# Patient Record
Sex: FEMALE | HISPANIC OR LATINO | URBAN - METROPOLITAN AREA
[De-identification: names, ages, dates, MRNs, and addresses within clinical notes are randomized per-mention and may not be internally consistent; named-entity substitution may affect disease eponyms.]

---

## 2018-07-23 ENCOUNTER — OFFICE VISIT (OUTPATIENT)
Dept: URBAN - METROPOLITAN AREA CLINIC 48 | Facility: CLINIC | Age: 78
End: 2018-07-23
Payer: COMMERCIAL

## 2018-07-23 DIAGNOSIS — H40.1132 PRIMARY OPEN-ANGLE GLAUCOMA, BILATERAL, MODERATE STAGE: Primary | ICD-10-CM

## 2018-07-23 PROCEDURE — 92083 EXTENDED VISUAL FIELD XM: CPT | Performed by: OPHTHALMOLOGY

## 2018-07-23 PROCEDURE — 92014 COMPRE OPH EXAM EST PT 1/>: CPT | Performed by: OPHTHALMOLOGY

## 2018-07-23 PROCEDURE — 92133 CPTRZD OPH DX IMG PST SGM ON: CPT | Performed by: OPHTHALMOLOGY

## 2018-07-23 ASSESSMENT — INTRAOCULAR PRESSURE
OD: 15
OS: 16

## 2018-07-23 NOTE — IMPRESSION/PLAN
Impression: Primary open-angle glaucoma, bilateral, moderate stage: F57.5042. Plan: IOP elevated on current regimen, due to patient not being compliant with eye drop regimen. Discussed risks and benefits and patient understands. Advised patient of condition. Emphasized and explained compliance. Poor compliance can lead to blindness. Discussed risks of progression. Medication instillation reviewed and understood.  Patient to use brimondine tid ou and latanoprost qhs ou

## 2018-09-06 ENCOUNTER — OFFICE VISIT (OUTPATIENT)
Dept: URBAN - METROPOLITAN AREA CLINIC 48 | Facility: CLINIC | Age: 78
End: 2018-09-06
Payer: COMMERCIAL

## 2018-09-06 PROCEDURE — 92012 INTRM OPH EXAM EST PATIENT: CPT | Performed by: OPHTHALMOLOGY

## 2018-09-06 RX ORDER — BRIMONIDINE TARTRATE 2 MG/ML
0.2 % SOLUTION/ DROPS OPHTHALMIC
Qty: 1 | Refills: 4 | Status: INACTIVE
Start: 2018-09-06 | End: 2018-12-10

## 2018-09-06 RX ORDER — LATANOPROST 50 UG/ML
0.005 % SOLUTION OPHTHALMIC
Qty: 1 | Refills: 11 | Status: INACTIVE
Start: 2018-09-06 | End: 2018-12-10

## 2018-09-06 ASSESSMENT — INTRAOCULAR PRESSURE
OS: 11
OD: 14

## 2018-09-06 NOTE — IMPRESSION/PLAN
Impression: Primary open-angle glaucoma, bilateral, moderate stage: S86.8505. Plan: 
IOP has improved with patient being compliant with gtts Continue current medications RTC
4 months IOP check

## 2018-12-10 ENCOUNTER — OFFICE VISIT (OUTPATIENT)
Dept: URBAN - METROPOLITAN AREA CLINIC 48 | Facility: CLINIC | Age: 78
End: 2018-12-10
Payer: COMMERCIAL

## 2018-12-10 PROCEDURE — 92012 INTRM OPH EXAM EST PATIENT: CPT | Performed by: OPHTHALMOLOGY

## 2018-12-10 RX ORDER — LATANOPROST 50 UG/ML
0.005 % SOLUTION OPHTHALMIC
Qty: 1 | Refills: 11 | Status: INACTIVE
Start: 2018-12-10 | End: 2019-04-08

## 2018-12-10 RX ORDER — BRIMONIDINE TARTRATE 2 MG/ML
0.2 % SOLUTION/ DROPS OPHTHALMIC
Qty: 1 | Refills: 7 | Status: INACTIVE
Start: 2018-12-10 | End: 2019-04-08

## 2018-12-10 ASSESSMENT — INTRAOCULAR PRESSURE
OS: 11
OD: 13

## 2019-04-08 ENCOUNTER — OFFICE VISIT (OUTPATIENT)
Dept: URBAN - METROPOLITAN AREA CLINIC 48 | Facility: CLINIC | Age: 79
End: 2019-04-08
Payer: COMMERCIAL

## 2019-04-08 PROCEDURE — 92012 INTRM OPH EXAM EST PATIENT: CPT | Performed by: OPHTHALMOLOGY

## 2019-04-08 RX ORDER — LATANOPROST 50 UG/ML
0.005 % SOLUTION OPHTHALMIC
Qty: 1 | Refills: 11 | Status: ACTIVE
Start: 2019-04-08

## 2019-04-08 RX ORDER — BRIMONIDINE TARTRATE 2 MG/ML
0.2 % SOLUTION/ DROPS OPHTHALMIC
Qty: 1 | Refills: 7 | Status: ACTIVE
Start: 2019-04-08

## 2019-04-08 ASSESSMENT — INTRAOCULAR PRESSURE
OS: 13
OD: 13

## 2019-04-08 NOTE — IMPRESSION/PLAN
Impression: Primary open-angle glaucoma, bilateral, moderate stage: E56.1270. Plan: Discussed and reviewed diagnosis with patient today, understood by patient, intraocular pressure stable with medication. Continue medications and observe. Importance of compliance with medications and regular follow-up reiterated, will continue to monitor. Patient to continue brimonidine tid ou and latanoprost qhs ou.

## 2019-10-08 ENCOUNTER — OFFICE VISIT (OUTPATIENT)
Dept: URBAN - METROPOLITAN AREA CLINIC 48 | Facility: CLINIC | Age: 79
End: 2019-10-08
Payer: COMMERCIAL

## 2019-10-08 PROCEDURE — 92014 COMPRE OPH EXAM EST PT 1/>: CPT | Performed by: OPHTHALMOLOGY

## 2019-10-08 PROCEDURE — 92083 EXTENDED VISUAL FIELD XM: CPT | Performed by: OPHTHALMOLOGY

## 2019-10-08 PROCEDURE — 92133 CPTRZD OPH DX IMG PST SGM ON: CPT | Performed by: OPHTHALMOLOGY

## 2019-10-08 RX ORDER — TIMOLOL MALEATE 5 MG/ML
0.5 % SOLUTION/ DROPS OPHTHALMIC
Qty: 10 | Refills: 3 | Status: ACTIVE
Start: 2019-10-08

## 2019-10-08 ASSESSMENT — INTRAOCULAR PRESSURE
OS: 13
OD: 13

## 2019-10-08 NOTE — IMPRESSION/PLAN
Impression: Primary open-angle glaucoma, bilateral, moderate stage: I40.1081. Plan: Discussed and reviewed diagnosis with patient today, understood by patient, discussed reviewed VF and OCT with patient today, intraocular pressure not stable OS with medication. Patient to start Timolol bid OS. Importance of compliance with medications and regular follow-up reiterated, will continue to monitor. Patient to continue Brimonidine tid ou, Latanaprost qhs ou. Discussed side effects of Timolol are: blurred vision; burning stinging and itching of the eyes or eyelids; sensitivity to light; changes in blood sugar; difficulty breathing; wheezing; and difficulty sleeping.

## 2019-12-02 ENCOUNTER — OFFICE VISIT (OUTPATIENT)
Dept: URBAN - METROPOLITAN AREA CLINIC 48 | Facility: CLINIC | Age: 79
End: 2019-12-02
Payer: COMMERCIAL

## 2019-12-02 PROCEDURE — 92012 INTRM OPH EXAM EST PATIENT: CPT | Performed by: OPHTHALMOLOGY

## 2019-12-02 ASSESSMENT — INTRAOCULAR PRESSURE
OD: 14
OS: 13

## 2019-12-02 NOTE — IMPRESSION/PLAN
Impression: Primary open-angle glaucoma, bilateral, moderate stage: Y89.0617. Plan: IOP borderline today, patient to use all 3 gtts to ou. Use Brim tid ou, Jason bid ou and Porsche qhs ou. Patient advised to use AFTS ou bid to tid ou.

## 2022-08-15 ENCOUNTER — OFFICE VISIT (OUTPATIENT)
Dept: URBAN - METROPOLITAN AREA CLINIC 48 | Facility: CLINIC | Age: 82
End: 2022-08-15
Payer: COMMERCIAL

## 2022-08-15 DIAGNOSIS — H40.1132 PRIMARY OPEN-ANGLE GLAUCOMA, BILATERAL, MODERATE STAGE: ICD-10-CM

## 2022-08-15 DIAGNOSIS — Z96.1 PRESENCE OF PSEUDOPHAKIA: ICD-10-CM

## 2022-08-15 DIAGNOSIS — E11.3293 TYPE 2 DIAB W MILD NONPRLF DIABETIC RTNOP W/O MACULAR EDEMA, BILATERAL: Primary | ICD-10-CM

## 2022-08-15 PROCEDURE — 92133 CPTRZD OPH DX IMG PST SGM ON: CPT | Performed by: STUDENT IN AN ORGANIZED HEALTH CARE EDUCATION/TRAINING PROGRAM

## 2022-08-15 PROCEDURE — 99214 OFFICE O/P EST MOD 30 MIN: CPT | Performed by: STUDENT IN AN ORGANIZED HEALTH CARE EDUCATION/TRAINING PROGRAM

## 2022-08-15 RX ORDER — LATANOPROST 50 UG/ML
0.005 % SOLUTION OPHTHALMIC
Qty: 5 | Refills: 8 | Status: ACTIVE
Start: 2022-08-15

## 2022-08-15 RX ORDER — TIMOLOL MALEATE 5 MG/ML
0.5 % SOLUTION/ DROPS OPHTHALMIC
Qty: 10 | Refills: 3 | Status: ACTIVE
Start: 2022-08-15

## 2022-08-15 ASSESSMENT — INTRAOCULAR PRESSURE
OD: 12
OS: 12

## 2022-08-15 NOTE — IMPRESSION/PLAN
Impression: Type 2 diab w mild nonprlf diabetic rtnop w/o macular edema, bilateral: B03.6713. Plan: Continue glucose, BP, and lipid control as per PCP. Recommend annual DM exams.

## 2022-08-15 NOTE — IMPRESSION/PLAN
Impression: Primary open-angle glaucoma, bilateral, moderate stage: N36.8229. RNFL Findings: OD 79 um, OS 60 um Plan: IOP within good range today. Reviewed and discussed testing with pt. OU Continue Use Brim TID and Porsche QHS 
- Erx refill sent to pharmacy today. - Per previous plan 12/2019 pt. was on Brim. Per pt. and fam. pt. self d/c or never started Brim, unsure.  

RTC in 4 months with Dr. Lin Martin for IOP check (long exam)